# Patient Record
Sex: MALE | Race: WHITE | Employment: FULL TIME | URBAN - METROPOLITAN AREA
[De-identification: names, ages, dates, MRNs, and addresses within clinical notes are randomized per-mention and may not be internally consistent; named-entity substitution may affect disease eponyms.]

---

## 2019-12-11 ENCOUNTER — HOSPITAL ENCOUNTER (OUTPATIENT)
Dept: GENERAL RADIOLOGY | Age: 44
Discharge: HOME | End: 2019-12-13
Payer: COMMERCIAL

## 2019-12-11 ENCOUNTER — HOSPITAL ENCOUNTER (OUTPATIENT)
Age: 44
Discharge: HOME | End: 2019-12-13
Payer: COMMERCIAL

## 2019-12-11 DIAGNOSIS — M79.604 RIGHT LEG PAIN: ICD-10-CM

## 2019-12-11 PROCEDURE — 73590 X-RAY EXAM OF LOWER LEG: CPT

## 2020-01-07 ENCOUNTER — HOSPITAL ENCOUNTER (OUTPATIENT)
Dept: MRI IMAGING | Age: 45
Discharge: HOME | End: 2020-01-09
Payer: COMMERCIAL

## 2020-01-07 PROCEDURE — 73718 MRI LOWER EXTREMITY W/O DYE: CPT

## 2024-02-22 ENCOUNTER — OFFICE VISIT (OUTPATIENT)
Age: 49
End: 2024-02-22
Payer: COMMERCIAL

## 2024-02-22 VITALS
HEIGHT: 70 IN | BODY MASS INDEX: 33.36 KG/M2 | DIASTOLIC BLOOD PRESSURE: 89 MMHG | TEMPERATURE: 98.2 F | OXYGEN SATURATION: 96 % | WEIGHT: 233 LBS | SYSTOLIC BLOOD PRESSURE: 149 MMHG | HEART RATE: 71 BPM

## 2024-02-22 DIAGNOSIS — K63.5 BENIGN COLON POLYP: Primary | ICD-10-CM

## 2024-02-22 PROCEDURE — 99213 OFFICE O/P EST LOW 20 MIN: CPT | Performed by: NURSE PRACTITIONER

## 2024-02-22 RX ORDER — BUPROPION HYDROCHLORIDE 150 MG/1
1 TABLET ORAL EVERY MORNING
COMMUNITY
Start: 2023-10-13

## 2024-02-22 RX ORDER — ROSUVASTATIN CALCIUM 20 MG/1
1 TABLET, COATED ORAL EVERY MORNING
COMMUNITY
Start: 2023-10-13

## 2024-02-22 RX ORDER — SEMAGLUTIDE 0.68 MG/ML
INJECTION, SOLUTION SUBCUTANEOUS
COMMUNITY
Start: 2024-01-29

## 2024-02-22 RX ORDER — DEXTROAMPHETAMINE SACCHARATE, AMPHETAMINE ASPARTATE MONOHYDRATE, DEXTROAMPHETAMINE SULFATE AND AMPHETAMINE SULFATE 7.5; 7.5; 7.5; 7.5 MG/1; MG/1; MG/1; MG/1
30 CAPSULE, EXTENDED RELEASE ORAL DAILY
COMMUNITY
Start: 2024-02-19

## 2024-02-22 RX ORDER — MELOXICAM 15 MG/1
15 TABLET ORAL EVERY MORNING
COMMUNITY

## 2024-02-22 RX ORDER — LIFITEGRAST 50 MG/ML
1 SOLUTION/ DROPS OPHTHALMIC 2 TIMES DAILY
COMMUNITY
Start: 2023-11-22

## 2024-02-22 ASSESSMENT — ENCOUNTER SYMPTOMS
SORE THROAT: 0
RHINORRHEA: 0
COUGH: 0
SHORTNESS OF BREATH: 0

## 2024-02-22 NOTE — PROGRESS NOTES
[facility] General Surgery   Jesus Wei MD, FACS  Demetra SCHAEFERJose Pan, APRN-CNP  [address], [address]  [place] [address]  P: [phone], F: [phone]    General and Robotic Surgery  Follow-Up Visit Note               PATIENT NAME: Roosevelt Fang   :  1975   MRN: [id number]   PCP:  Fabienne Ramirez MD     TODAY'S DATE: 2024    Chief Complaint   Patient presents with    Follow-up     Follow up colonoscopy no issues       HISTORY OF PRESENT ILLNESS: 48 y.o. male presents for follow up colonoscopy 23. Bowels moving normally. Denies family hx of colon CA.  Patient tolerated procedure well, no rectal bleeding or pain noted after procedure.    Findings per Dr. Wei's procedure note are reviewed below:  Terminal ileum: normal    Cecum/Ascending colon: abnormal: Polyp at ileocecal valve removed with cold biopsy forceps    Transverse colon: normal    Descending/Sigmoid colon: normal    Rectum/Anus: examined in normal and retroflexed positions and was abnormal: Low rectal polyp removed with cold biopsy forceps  Pathology:  Final Pathologic Diagnosis   1.  \"Ileocecal valve polyp\", biopsy:        Tubular adenoma.     2.  \"Rectal polyp\", biopsy:        Tubular adenoma.     PAST MEDICAL HISTORY     Past Medical History:   Diagnosis Date    ADHD (attention deficit hyperactivity disorder)     Depression     Diabetes mellitus (HCC)     Hyperlipidemia        PROBLEM LIST   There is no problem list on file for this patient.      SURGICAL HISTORY       Past Surgical History:   Procedure Laterality Date    BACK SURGERY      COLONOSCOPY      LEG SURGERY         ALLERGIES   No Known Allergies    FAMILY HISTORY   family history is not on file.    SOCIAL HISTORY    reports that he has never smoked. His smokeless tobacco use includes chew. He reports current alcohol use of about 2.0 standard drinks of alcohol per week.    MEDICATIONS     Current Outpatient Medications:

## 2025-04-23 ENCOUNTER — HOSPITAL ENCOUNTER (OUTPATIENT)
Dept: OCCUPATIONAL THERAPY | Age: 50
Setting detail: THERAPIES SERIES
Discharge: HOME | End: 2025-04-23
Payer: COMMERCIAL

## 2025-04-23 PROCEDURE — 97165 OT EVAL LOW COMPLEX 30 MIN: CPT

## 2025-04-23 PROCEDURE — 97110 THERAPEUTIC EXERCISES: CPT

## 2025-04-23 NOTE — THERAPY EVALUATION
[] [facility]  Outpatient Rehabilitation &  Therapy  [address]  [phone]  F: [phone] [] [facility]  Outpatient Rehabilitation &  Therapy  [address]   [address]  P: [phone]  F: [phone] [] [facility]  Outpatient Rehabilitation &  Therapy  [address]  P: [phone]  F: [phone] [] [facility]  Outpatient Rehabilitation &  Therapy  [address]   P: [phone]  F: [phone] [x] [facility]   Outpatient Rehabilitation   & Therapy  [address]  P: [phone]   F: [phone]       Occupational Therapy Hand & Upper Extremity  Initial Evaluation    Date: 2025      Patient: Roosevelt Fang  : 1975  MRN: [id number]  Referring Provider:  Scot Mcarthur MD   Insurance: [facility]   visit limit 60  Medical Diagnosis:   L02.519 (ICD-10-CM) - Cutaneous abscess of unspecified hand   L03.119 (ICD-10-CM) - Cellulitis of unspecified part of limb   M65.142 (ICD-10-CM) - Other infective (teno)synovitis, left hand      Rehab Codes: pain in hand M79.646, or stiffness in hand M25.64,  Onset Date: 3/9/25    Next  Appt: 25    Subjective:   CC: Had an infection base of 5th digit left hand. Sx 3/9/25. Difficulty extending little and ring finger. Pushing through hand is difficult  HPI: sx 3/9/25 IRRIGATION, DEBRIDEMENT AND DRAINAGE OF HAND ABSCESS WITH FLEXOR TENDON SHEATH IRRIGATION   Pt had an infection base of little finger, quickly spread up arm. Sx on 3/9/25    CT left hand reviewed while inpatient - localized soft tissue swelling and edema centered at level of 5th MCP joint and proximal phalanx extending laterally to the palmar soft tissues between 4th and 5th MCP joints, no convincing evidence for osteomyelitis   Mechanism of Injury: unknown  Surgery Date:3/8/25  Precautions: None no lifting more than 10-15lbs per pt    Fall Risk:  [] Yes  [x] No    Involved Extremity:

## 2025-04-28 NOTE — FLOWSHEET NOTE
[facility]  Outpatient Rehabilitation &  Therapy  [address]     [phone]  F: [phone]   [] [facility]  Outpatient Rehabilitation &  Therapy  [address]   [address]  P: [phone]  F: [phone] [] [facility]  Outpatient Rehabilitation &  Therapy  [address]  P: [phone]  F: [phone]  [x] [facility]   Outpatient Rehabilitation & Therapy  [address]  P: [phone]   F: [phone]     Occupational Therapy Daily Treatment Note    Date:  2025   Patient Name:  Roosevelt Fang    :  1975  MRN: [id number]  Referring Provider:  Scot Mcarthur MD   Insurance: [facility]   visit limit 60  Medical Diagnosis:   L02.519 (ICD-10-CM) - Cutaneous abscess of unspecified hand   L03.119 (ICD-10-CM) - Cellulitis of unspecified part of limb   M65.142 (ICD-10-CM) - Other infective (teno)synovitis, left hand                       Rehab Codes: pain in hand M79.646, or stiffness in hand M25.64,  Onset Date: 3/9/25                   Next  Appt: 25  Visit# / total visits: ; Progress note for Medicare patient due at visit 6    Cancels/No Shows: 0/0      Subjective:    Pain:  [x] Yes  [] No Location: base of 5th and 4th  Pain Rating: (0-10 scale) 2/10  Pain altered Tx:  [x] No  [] Yes  Action:  Pt Comments:Pt states doing his home program. Still not extending the finger well. Grasp is much better    Objective:  Modalities:  Precautions: No lifting more than 10-15lbs per pt   Exercises:  EXERCISE    REPS/     TIME  WEIGHT/    LEVEL COMMENTS Complete = X   HEP   Green putty, scar massage, ROM  Towel walking, finger taps  Reviewed  X added 25   ROM   4th/5th digit  Blocking PIP/DIP 2x10 X  X    Scar Mobilization   Little finger MCP to PIP   IASTM 5th digit MCP to PIP X  X    Power web x15 red , twist, extension left  X    Velcroboard  x15  #5 isolate finger ext forward/back X

## 2025-04-29 ENCOUNTER — HOSPITAL ENCOUNTER (OUTPATIENT)
Dept: OCCUPATIONAL THERAPY | Age: 50
Setting detail: THERAPIES SERIES
Discharge: HOME | End: 2025-04-29
Payer: COMMERCIAL

## 2025-04-29 PROCEDURE — 97110 THERAPEUTIC EXERCISES: CPT

## 2025-04-29 PROCEDURE — 97140 MANUAL THERAPY 1/> REGIONS: CPT

## 2025-05-06 ENCOUNTER — HOSPITAL ENCOUNTER (OUTPATIENT)
Dept: OCCUPATIONAL THERAPY | Age: 50
Setting detail: THERAPIES SERIES
Discharge: HOME | End: 2025-05-06
Payer: COMMERCIAL

## 2025-05-06 PROCEDURE — 97110 THERAPEUTIC EXERCISES: CPT

## 2025-05-06 PROCEDURE — 97140 MANUAL THERAPY 1/> REGIONS: CPT

## 2025-05-06 NOTE — FLOWSHEET NOTE
[facility]  Outpatient Rehabilitation &  Therapy  [address]     [phone]  F: [phone]   [] [facility]  Outpatient Rehabilitation &  Therapy  [address]   [address]  P: [phone]  F: [phone] [] [facility]  Outpatient Rehabilitation &  Therapy  [address]  P: [phone]  F: [phone]  [x] [facility]   Outpatient Rehabilitation & Therapy  [address]  P: [phone]   F: [phone]     Occupational Therapy Daily Treatment Note    Date:  2025   Patient Name:  Roosevelt Fang    :  1975  MRN: [id number]  Referring Provider:  Scot Mcarthur MD   Insurance: [facility]   visit limit 60  Medical Diagnosis:   L02.519 (ICD-10-CM) - Cutaneous abscess of unspecified hand   L03.119 (ICD-10-CM) - Cellulitis of unspecified part of limb   M65.142 (ICD-10-CM) - Other infective (teno)synovitis, left hand                       Rehab Codes: pain in hand M79.646, or stiffness in hand M25.64,  Onset Date: 3/9/25                   Next  Appt: 25  Visit# / total visits: 3/6; Progress note for Medicare patient due at visit 6    Cancels/No Shows: 0/0      Subjective:    Pain:  [] Yes  [x] No Location: base of 5th and 4th  Pain Rating: (0-10 scale) 0/10  Pain altered Tx:  [x] No  [] Yes  Action:  Pt Comments:    Objective:  Modalities:  Precautions: No lifting more than 10-15lbs per pt   Exercises:  EXERCISE    REPS/     TIME  WEIGHT/    LEVEL COMMENTS Complete = X   HEP   Green putty, scar massage, ROM  Towel walking, finger taps  Reviewed     ROM   4th/5th digit  Blocking PIP/DIP 2x10 X  X    Scar Mobilization   Little finger MCP to PIP   IASTM 5th digit MCP to PIP X  X    Power web x15 red , twist, extension left , flat hand X    Velcroboard  x15  #5 isolate finger ext forward/back -    Velcro  board   Remove using 5th digit extension X added   Graded clips  Yellow-blue Place on vertical pole  with

## 2025-05-13 ENCOUNTER — HOSPITAL ENCOUNTER (OUTPATIENT)
Dept: OCCUPATIONAL THERAPY | Age: 50
Setting detail: THERAPIES SERIES
Discharge: HOME | End: 2025-05-13
Payer: COMMERCIAL

## 2025-05-13 PROCEDURE — 97110 THERAPEUTIC EXERCISES: CPT

## 2025-05-13 PROCEDURE — 97140 MANUAL THERAPY 1/> REGIONS: CPT

## 2025-05-13 NOTE — THERAPY DISCHARGE
[place]  Outpatient Rehabilitation &  Therapy  [address]     [phone]  F: [phone]   [] [facility]  Outpatient Rehabilitation &  Therapy  [address]   [address]  P: [phone]  F: [phone] [] [facility]  Outpatient Rehabilitation &  Therapy  [address]  P: [phone]  F: [phone]  [x] [facility]   Outpatient Rehabilitation & Therapy  [address]  P: [phone]   F: [phone]     Occupational Therapy Daily Treatment Note/Discharge    Date:  2025   Patient Name:  Roosevelt Fang    :  1975  MRN: [id number]  Referring Provider:  Scot Mcarthur MD   Insurance: [facility]   visit limit 60  Medical Diagnosis:   L02.519 (ICD-10-CM) - Cutaneous abscess of unspecified hand   L03.119 (ICD-10-CM) - Cellulitis of unspecified part of limb   M65.142 (ICD-10-CM) - Other infective (teno)synovitis, left hand                       Rehab Codes: pain in hand M79.646, or stiffness in hand M25.64,  Onset Date: 3/9/25                   Next  Appt: 25  Visit# / total visits: ; Progress note for Medicare patient due at visit 6    Cancels/No Shows: 0/0  Review Period: 25-25    Subjective:    Pain:  [x] Yes  [] No Location: base of 5th and 4th  Pain Rating: (0-10 scale) 0/10 at rest,  3/10 with ex's  Pain altered Tx:  [x] No  [] Yes  Action:  Pt Comments: Pt states able to use hand for all activities, completes HEP however sometimes needs to stop due to pain at DIP with continual stretching.    Objective:  Modalities:  Precautions: No lifting more than 10-15lbs per pt   Exercises:  EXERCISE    REPS/     TIME  WEIGHT/    LEVEL COMMENTS Complete = X   HEP   Green putty, scar massage, ROM  Towel walking, finger taps  Reviewed     ROM   4th/5th digit  Blocking PIP/DIP 2x10 X  X    Scar Mobilization   Little finger MCP to PIP   IASTM 5th digit MCP to PIP X  X    Power web x15 red , twist,